# Patient Record
Sex: MALE | URBAN - METROPOLITAN AREA
[De-identification: names, ages, dates, MRNs, and addresses within clinical notes are randomized per-mention and may not be internally consistent; named-entity substitution may affect disease eponyms.]

---

## 2018-12-28 ENCOUNTER — RECORDS - HEALTHEAST (OUTPATIENT)
Dept: LAB | Facility: HOSPITAL | Age: 23
End: 2018-12-28

## 2018-12-28 LAB — HBV SURFACE AB SERPL IA-ACNC: POSITIVE M[IU]/ML

## 2018-12-31 LAB
GAMMA INTERFERON BACKGROUND BLD IA-ACNC: 0.06 IU/ML
M TB IFN-G BLD-IMP: NEGATIVE
MITOGEN IGNF BCKGRD COR BLD-ACNC: 0 IU/ML
MITOGEN IGNF BCKGRD COR BLD-ACNC: 0 IU/ML
QTF INTERPRETATION: NORMAL
QTF MITOGEN - NIL: 7.41 IU/ML

## 2019-04-24 ENCOUNTER — VIRTUAL VISIT (OUTPATIENT)
Dept: FAMILY MEDICINE | Facility: OTHER | Age: 24
End: 2019-04-24

## 2019-04-24 NOTE — PROGRESS NOTES
"Date:   Clinician: Manjeet See  Clinician NPI: [id number]  Patient: Christiano Cast  Patient : 1995  Patient Address: [address] [place] [address], [place] [id number]  Patient Phone: [phone]  Visit Protocol: GERD  Patient Summary:  Christiano is a 23 year old ( : 1995 ) male who initiated a Visit for evaluation of heartburn or reflux (GERD).  When asked the question \"Please sign me up to receive news, health information and promotions from [employer].\", Christiano responded \"No\".    His symptoms have persisted for 1 - 2 months.  The patient currently feels his symptoms are about the same as usual and last several hours in a row.   Over the past 7 days, the patient had experienced:      Heartburn: for 4-7 days     Regurgitation: for 2-3 days     Nausea: once     Pain the center of the upper stomach: for 2-3 days     Difficulty getting a good night's sleep because of heartburn and/or regurgitation: never      Christiano took medications to treat heartburn and/or regurgitation other than what was previously prescribed for 4-7 days over the past 7 days.    Christiano claims:     Lying down aggravates symptoms     Bending over aggravates symptoms      The patient denies:     Heartburn with unintentional weight loss.    Difficulty swallowing solid foods    Vomiting blood    Persistent vomiting.    Chest Pain    Wheezing associated with heartburn    Crohn's disease    Ulcerative colitis    Tumors in colon     A modified diet has been partially effective.   The patient weighs approximately: 175 pounds and is 6 Feet 1 inch tall.   PAST TREATMENTS:        Tums or Rolaids: somewhat effective (patient used for 1-2 weeks)       Omeprazole (Prilosec OTC): ineffective (patient used for 1 month)     Past medication(s) tried as reported by the patient (free-text): Omeprazole, ineffective. Tums somewhat effective      Christiano does not smoke or use smokeless tobacco.   MEDICATIONS: fluticasone nasal, Truvada oral, ALLERGIES: " NKDA  Clinician Response:  Dear Christiano,  Based on the information you provided, you likely have GERD (Esophageal reflux), also known as heartburn.  GERD or heartburn occurs when stomach acid comes up from the stomach into the throat or esophagus. This often causes burning in the chest behind the breastbone and a burning sensation in the throat.    Unless you are allergic to the over-the-counter medication(s) below, I recommend using:  Omeprazole (Prilosec) 20mg take 1 tablet by mouth once a day for 4 weeks.  Over-the-counter medications do not require a prescription. Ask the pharmacist if you have any questions.  Try the following to help reduce your GERD symptoms:     Elevate the head of your bed with wood blocks by 6 inches if you are having nighttime symptoms    Decrease fat intake    Avoid eating large meals    Smoking, chewing tobacco, caffeine, alcohol, and anti-inflammatory medications (such as ibuprofen or aspirin) can increase stomach acid which can make GERD worse.    Stay upright for 2-3 hours after eating. Eating a large meal and then laying down, increases GERD symptoms.    Avoid spicy foods and acidic foods such as tomato and citrus      Sometimes long-standing uncontrolled GERD (symptoms more than one year) can cause stomach ulcers, cancerous, or precancerous changes in the throat. Be seen in a clinic if you notice any of the following symptoms:      Weight loss    Blood in your stool or throwing up blood    Vomiting    Trouble swallowing    Pain with swallowing     Rarely, heart-related symptoms can be mistaken for GERD. Heart-related symptoms would include chest pain with exertion or chest pain with radiation to the jaw or arm, shortness of breath, or sweats. If you are having any of these symptoms, seek immediate medical care by calling 911 or contact your clinic.   Diagnosis: GERD (Esophageal reflux)  Diagnosis ICD: K21.9  Addendum created: April 24 16:45:20, 2019 created by: Manjeet See body:  Call in zantac 150 mg  1 tab po bid  #60  No refills